# Patient Record
Sex: FEMALE | Race: WHITE | NOT HISPANIC OR LATINO | URBAN - METROPOLITAN AREA
[De-identification: names, ages, dates, MRNs, and addresses within clinical notes are randomized per-mention and may not be internally consistent; named-entity substitution may affect disease eponyms.]

---

## 2019-05-31 ENCOUNTER — IMPORTED ENCOUNTER (OUTPATIENT)
Dept: URBAN - METROPOLITAN AREA CLINIC 50 | Facility: CLINIC | Age: 79
End: 2019-05-31

## 2019-06-13 ENCOUNTER — IMPORTED ENCOUNTER (OUTPATIENT)
Dept: URBAN - METROPOLITAN AREA CLINIC 50 | Facility: CLINIC | Age: 79
End: 2019-06-13

## 2019-06-20 ENCOUNTER — IMPORTED ENCOUNTER (OUTPATIENT)
Dept: URBAN - METROPOLITAN AREA CLINIC 50 | Facility: CLINIC | Age: 79
End: 2019-06-20

## 2019-06-20 NOTE — PATIENT DISCUSSION
"""Phaco with IOL OD: 06/25/2019 Dewayne ZCB00 +18.50 Target: [facility] [place]

## 2019-06-25 ENCOUNTER — IMPORTED ENCOUNTER (OUTPATIENT)
Dept: URBAN - METROPOLITAN AREA CLINIC 50 | Facility: CLINIC | Age: 79
End: 2019-06-25

## 2019-06-25 NOTE — PATIENT DISCUSSION
"""S/P IOL OD: Tecnis ZCB00 +18.50 (Target: [place]) +Femto/Arcs +Omidria. Continue post operative instructions and drops per schedule.  """

## 2019-07-01 ENCOUNTER — IMPORTED ENCOUNTER (OUTPATIENT)
Dept: URBAN - METROPOLITAN AREA CLINIC 50 | Facility: CLINIC | Age: 79
End: 2019-07-01

## 2019-07-01 NOTE — PATIENT DISCUSSION
"""S/P IOL OD: Tecnis ZCB00 +18.50 (Target: [place]) +Femto/Arcs +Omidria. Continue post operative instructions and drops per schedule.  """

## 2019-07-16 ENCOUNTER — IMPORTED ENCOUNTER (OUTPATIENT)
Dept: URBAN - METROPOLITAN AREA CLINIC 50 | Facility: CLINIC | Age: 79
End: 2019-07-16

## 2019-07-25 ENCOUNTER — IMPORTED ENCOUNTER (OUTPATIENT)
Dept: URBAN - METROPOLITAN AREA CLINIC 50 | Facility: CLINIC | Age: 79
End: 2019-07-25

## 2019-07-25 NOTE — PATIENT DISCUSSION
"""S/P IOL OS: Tecnis ZCB00 +22.5 (Target: -0.8) +ORA/Femto/Arcs +Omidria. Continue post operative instructions and drops per schedule.  """

## 2019-08-15 ENCOUNTER — IMPORTED ENCOUNTER (OUTPATIENT)
Dept: URBAN - METROPOLITAN AREA CLINIC 50 | Facility: CLINIC | Age: 79
End: 2019-08-15

## 2019-08-28 ENCOUNTER — IMPORTED ENCOUNTER (OUTPATIENT)
Dept: URBAN - METROPOLITAN AREA CLINIC 50 | Facility: CLINIC | Age: 79
End: 2019-08-28

## 2019-12-19 ENCOUNTER — IMPORTED ENCOUNTER (OUTPATIENT)
Dept: URBAN - METROPOLITAN AREA CLINIC 50 | Facility: CLINIC | Age: 79
End: 2019-12-19

## 2020-07-02 ENCOUNTER — IMPORTED ENCOUNTER (OUTPATIENT)
Dept: URBAN - METROPOLITAN AREA CLINIC 50 | Facility: CLINIC | Age: 80
End: 2020-07-02

## 2020-07-13 ENCOUNTER — IMPORTED ENCOUNTER (OUTPATIENT)
Dept: URBAN - METROPOLITAN AREA CLINIC 50 | Facility: CLINIC | Age: 80
End: 2020-07-13

## 2021-04-17 ASSESSMENT — VISUAL ACUITY
OS_PH: 20/80+1
OD_CC: 20/50-1
OS_OTHER: 20/70. 20/70.
OD_BAT: 20/70
OS_PH: 20/25+1
OD_SC: 20/70-
OS_BAT: 20/70
OD_CC: J3
OS_OTHER: 20/30. 20/40.
OD_SC: 20/30-
OD_SC: 20/30+
OD_SC: 20/30+1
OD_CC: J2-
OD_OTHER: 20/40. 20/40.
OD_OTHER: 20/70. 20/100.
OD_PH: 20/25+2
OS_PH: 20/25
OD_BAT: 20/40
OS_SC: 20/70
OD_PH: 20/30
OD_PH: 20/25
OS_SC: 20/70
OD_CC: J1+
OS_OTHER: 20/70. 20/70.
OD_SC: 20/25+2
OS_SC: 20/60
OD_BAT: 20/40
OS_CC: 20/200
OS_SC: 20/50-1
OS_CC: J3
OS_CC: J1+
OD_OTHER: 20/70. 20/100.
OD_BAT: 20/70
OD_SC: 20/30
OS_PH: @ 14 IN
OS_SC: 20/40-
OS_BAT: 20/70
OS_BAT: 20/40
OS_PH: 20/30
OD_CC: 20/50
OS_SC: 20/60-
OS_BAT: 20/30
OD_PH: 20/25-1
OS_PH: 20/30
OS_OTHER: 20/40. 20/70.
OS_CC: J2-
OD_OTHER: 20/40. 20/70.
OS_BAT: 20/70
OS_CC: 20/40
OS_OTHER: 20/70. 20/100.
OS_CC: 20/40-1
OD_SC: 20/20

## 2021-04-17 ASSESSMENT — TONOMETRY
OS_IOP_MMHG: 12
OD_IOP_MMHG: 14
OS_IOP_MMHG: 16
OS_IOP_MMHG: 16
OS_IOP_MMHG: 10
OS_IOP_MMHG: 10
OD_IOP_MMHG: 18
OD_IOP_MMHG: 27
OS_IOP_MMHG: 17
OS_IOP_MMHG: 10
OD_IOP_MMHG: 14
OS_IOP_MMHG: 17
OD_IOP_MMHG: 11
OD_IOP_MMHG: 18
OD_IOP_MMHG: 8
OS_IOP_MMHG: 14
OD_IOP_MMHG: 17
OD_IOP_MMHG: 10